# Patient Record
Sex: MALE | Race: WHITE | Employment: OTHER | URBAN - NONMETROPOLITAN AREA
[De-identification: names, ages, dates, MRNs, and addresses within clinical notes are randomized per-mention and may not be internally consistent; named-entity substitution may affect disease eponyms.]

---

## 2017-12-01 RX ORDER — LOSARTAN POTASSIUM 50 MG/1
50 TABLET ORAL DAILY
COMMUNITY

## 2017-12-01 RX ORDER — DULOXETIN HYDROCHLORIDE 60 MG/1
60 CAPSULE, DELAYED RELEASE ORAL DAILY
COMMUNITY

## 2017-12-01 RX ORDER — PANTOPRAZOLE SODIUM 40 MG/1
40 TABLET, DELAYED RELEASE ORAL DAILY
COMMUNITY

## 2017-12-08 ENCOUNTER — HOSPITAL ENCOUNTER (OUTPATIENT)
Age: 82
Setting detail: OUTPATIENT SURGERY
Discharge: HOME | End: 2017-12-08
Attending: SPECIALIST | Admitting: SPECIALIST
Payer: MEDICARE

## 2017-12-08 VITALS
OXYGEN SATURATION: 97 % | WEIGHT: 183.4 LBS | TEMPERATURE: 98.1 F | SYSTOLIC BLOOD PRESSURE: 169 MMHG | HEIGHT: 69 IN | DIASTOLIC BLOOD PRESSURE: 70 MMHG | HEART RATE: 69 BPM | BODY MASS INDEX: 27.16 KG/M2 | RESPIRATION RATE: 18 BRPM

## 2017-12-08 PROCEDURE — 2500000003 HC RX 250 WO HCPCS: Performed by: SPECIALIST

## 2017-12-08 PROCEDURE — A6449 LT COMPRES BAND >=3" <5"/YD: HCPCS | Performed by: SPECIALIST

## 2017-12-08 PROCEDURE — 7100000011 HC PHASE II RECOVERY - ADDTL 15 MIN: Performed by: SPECIALIST

## 2017-12-08 PROCEDURE — 7100000010 HC PHASE II RECOVERY - FIRST 15 MIN: Performed by: SPECIALIST

## 2017-12-08 PROCEDURE — 88331 PATH CONSLTJ SURG 1 BLK 1SPC: CPT

## 2017-12-08 PROCEDURE — 88305 TISSUE EXAM BY PATHOLOGIST: CPT

## 2017-12-08 PROCEDURE — 3600000012 HC SURGERY LEVEL 2 ADDTL 15MIN: Performed by: SPECIALIST

## 2017-12-08 PROCEDURE — 3600000002 HC SURGERY LEVEL 2 BASE: Performed by: SPECIALIST

## 2017-12-08 PROCEDURE — A6223 GAUZE >16<=48 NO W/SAL W/O B: HCPCS | Performed by: SPECIALIST

## 2017-12-08 PROCEDURE — A6446 CONFORM BAND S W>=3" <5"/YD: HCPCS | Performed by: SPECIALIST

## 2017-12-08 RX ORDER — LIDOCAINE HYDROCHLORIDE AND EPINEPHRINE 10; 10 MG/ML; UG/ML
INJECTION, SOLUTION INFILTRATION; PERINEURAL PRN
Status: DISCONTINUED | OUTPATIENT
Start: 2017-12-08 | End: 2017-12-08 | Stop reason: HOSPADM

## 2017-12-08 ASSESSMENT — PAIN SCALES - GENERAL: PAINLEVEL_OUTOF10: 0

## 2017-12-08 ASSESSMENT — PAIN - FUNCTIONAL ASSESSMENT: PAIN_FUNCTIONAL_ASSESSMENT: 0-10

## 2017-12-08 NOTE — PROGRESS NOTES
1226:  Pt & son educated on d/c instructions, both verbalize understanding. Pt dressing for d/c.  /66, improving.    1225:  Pt escorted to passenger side of car, son driving him home. Pt d/c in stable condition.

## 2021-01-05 ENCOUNTER — HOSPITAL ENCOUNTER (EMERGENCY)
Age: 86
Discharge: ANOTHER HOSPITAL | End: 2021-01-05
Attending: EMERGENCY MEDICINE
Payer: MEDICARE

## 2021-01-05 ENCOUNTER — APPOINTMENT (OUTPATIENT)
Dept: GENERAL RADIOLOGY | Age: 86
End: 2021-01-05
Payer: MEDICARE

## 2021-01-05 ENCOUNTER — APPOINTMENT (OUTPATIENT)
Dept: CT IMAGING | Age: 86
End: 2021-01-05
Payer: MEDICARE

## 2021-01-05 VITALS
HEIGHT: 67 IN | OXYGEN SATURATION: 96 % | TEMPERATURE: 97.7 F | SYSTOLIC BLOOD PRESSURE: 177 MMHG | DIASTOLIC BLOOD PRESSURE: 79 MMHG | BODY MASS INDEX: 26.68 KG/M2 | HEART RATE: 74 BPM | WEIGHT: 170 LBS

## 2021-01-05 DIAGNOSIS — E86.0 DEHYDRATION: ICD-10-CM

## 2021-01-05 DIAGNOSIS — G92.8 TOXIC METABOLIC ENCEPHALOPATHY: Primary | ICD-10-CM

## 2021-01-05 LAB
ALBUMIN SERPL-MCNC: 3.4 GM/DL (ref 3.4–5)
ALP BLD-CCNC: 63 U/L (ref 46–116)
ALT SERPL-CCNC: 17 U/L (ref 14–63)
AMORPHOUS: ABNORMAL
ANION GAP: 13 MEQ/L (ref 8–16)
AST SERPL-CCNC: 22 U/L (ref 15–37)
BACTERIA: ABNORMAL
BASOPHILS # BLD: 0.3 % (ref 0–3)
BILIRUB SERPL-MCNC: 0.6 MG/DL (ref 0.2–1)
BILIRUBIN URINE: ABNORMAL
BLOOD, URINE: NEGATIVE
BUN BLDV-MCNC: 31 MG/DL (ref 7–18)
CASTS UA: ABNORMAL /LPF
CHARACTER, URINE: CLEAR
CHLORIDE BLD-SCNC: 103 MEQ/L (ref 98–107)
CO2: 25 MEQ/L (ref 21–32)
COLOR: YELLOW
CREAT SERPL-MCNC: 1.8 MG/DL (ref 0.6–1.3)
CRYSTALS, UA: ABNORMAL
EKG ATRIAL RATE: 97 BPM
EKG P AXIS: 86 DEGREES
EKG P-R INTERVAL: 138 MS
EKG Q-T INTERVAL: 362 MS
EKG QRS DURATION: 92 MS
EKG QTC CALCULATION (BAZETT): 459 MS
EKG R AXIS: 64 DEGREES
EKG T AXIS: 38 DEGREES
EKG VENTRICULAR RATE: 97 BPM
EOSINOPHILS RELATIVE PERCENT: 1.1 % (ref 0–4)
EPITHELIAL CELLS, UA: ABNORMAL /HPF
GFR, ESTIMATED: 38 ML/MIN/1.73M2
GLUCOSE BLD-MCNC: 135 MG/DL (ref 74–106)
GLUCOSE BLD-MCNC: 139 MG/DL (ref 70–108)
GLUCOSE, URINE: NEGATIVE MG/DL
HCT VFR BLD CALC: 43 % (ref 42–52)
HEMOGLOBIN: 14.1 GM/DL (ref 14–18)
ICTOTEST: NEGATIVE
KETONES, URINE: NEGATIVE
LACTATE: 2.1 MMOL/L (ref 0.9–1.7)
LEUKOCYTE ESTERASE, URINE: NEGATIVE
LYMPHOCYTES # BLD: 17.6 % (ref 15–47)
MAGNESIUM: 1.7 MG/DL (ref 1.8–2.4)
MCH RBC QN AUTO: 28.9 PG (ref 27–31)
MCHC RBC AUTO-ENTMCNC: 32.9 GM/DL (ref 33–37)
MCV RBC AUTO: 88 FL (ref 80–94)
MONOCYTES: 17.3 % (ref 0–12)
MUCUS: ABNORMAL
NITRITE, URINE: NEGATIVE
PDW BLD-RTO: 13.7 % (ref 11.5–14.5)
PH UA: 6 (ref 5–9)
PLATELET # BLD: 224 THOU/MM3 (ref 130–400)
PMV BLD AUTO: 7.7 FL (ref 7.4–10.4)
POC CALCIUM: 9 MG/DL (ref 8.5–10.1)
POTASSIUM SERPL-SCNC: 4 MEQ/L (ref 3.5–5.1)
PROTEIN UA: 30 MG/DL
RBC # BLD: 4.89 MILL/MM3 (ref 4.7–6.1)
RBC UA: ABNORMAL /HPF
REFLEX TO URINE C & S: ABNORMAL
SARS-COV-2, NAAT: NOT DETECTED
SEGS: 63.7 % (ref 43–75)
SODIUM BLD-SCNC: 141 MEQ/L (ref 136–145)
SPECIFIC GRAVITY UA: 1.02 (ref 1–1.03)
TOTAL PROTEIN: 7.5 GM/DL (ref 6.4–8.2)
TROPONIN I: < 0.017 NG/ML (ref 0.02–0.06)
UROBILINOGEN, URINE: 0.2 EU/DL (ref 0–1)
WBC # BLD: 5.8 THOU/MM3 (ref 4.8–10.8)
WBC UA: ABNORMAL /HPF

## 2021-01-05 PROCEDURE — 80053 COMPREHEN METABOLIC PANEL: CPT

## 2021-01-05 PROCEDURE — U0002 COVID-19 LAB TEST NON-CDC: HCPCS

## 2021-01-05 PROCEDURE — 99285 EMERGENCY DEPT VISIT HI MDM: CPT

## 2021-01-05 PROCEDURE — 93005 ELECTROCARDIOGRAM TRACING: CPT | Performed by: EMERGENCY MEDICINE

## 2021-01-05 PROCEDURE — 2580000003 HC RX 258: Performed by: EMERGENCY MEDICINE

## 2021-01-05 PROCEDURE — 85025 COMPLETE CBC W/AUTO DIFF WBC: CPT

## 2021-01-05 PROCEDURE — 81001 URINALYSIS AUTO W/SCOPE: CPT

## 2021-01-05 PROCEDURE — 71046 X-RAY EXAM CHEST 2 VIEWS: CPT

## 2021-01-05 PROCEDURE — 82948 REAGENT STRIP/BLOOD GLUCOSE: CPT

## 2021-01-05 PROCEDURE — 83735 ASSAY OF MAGNESIUM: CPT

## 2021-01-05 PROCEDURE — 84484 ASSAY OF TROPONIN QUANT: CPT

## 2021-01-05 PROCEDURE — 83605 ASSAY OF LACTIC ACID: CPT

## 2021-01-05 PROCEDURE — 96361 HYDRATE IV INFUSION ADD-ON: CPT

## 2021-01-05 PROCEDURE — 96360 HYDRATION IV INFUSION INIT: CPT

## 2021-01-05 PROCEDURE — 70450 CT HEAD/BRAIN W/O DYE: CPT

## 2021-01-05 PROCEDURE — 36415 COLL VENOUS BLD VENIPUNCTURE: CPT

## 2021-01-05 PROCEDURE — 2709999900 HC NON-CHARGEABLE SUPPLY

## 2021-01-05 RX ORDER — 0.9 % SODIUM CHLORIDE 0.9 %
1000 INTRAVENOUS SOLUTION INTRAVENOUS ONCE
Status: COMPLETED | OUTPATIENT
Start: 2021-01-05 | End: 2021-01-05

## 2021-01-05 RX ORDER — SODIUM CHLORIDE 9 MG/ML
INJECTION, SOLUTION INTRAVENOUS CONTINUOUS
Status: DISCONTINUED | OUTPATIENT
Start: 2021-01-05 | End: 2021-01-05 | Stop reason: HOSPADM

## 2021-01-05 RX ADMIN — SODIUM CHLORIDE 1000 ML: 9 INJECTION, SOLUTION INTRAVENOUS at 08:26

## 2021-01-05 RX ADMIN — SODIUM CHLORIDE 100 ML/HR: 9 INJECTION, SOLUTION INTRAVENOUS at 15:37

## 2021-01-05 RX ADMIN — SODIUM CHLORIDE 200 ML/HR: 9 INJECTION, SOLUTION INTRAVENOUS at 08:09

## 2021-01-05 ASSESSMENT — ENCOUNTER SYMPTOMS
SORE THROAT: 1
DIARRHEA: 1
SHORTNESS OF BREATH: 0
WHEEZING: 0
VOMITING: 0
ABDOMINAL PAIN: 0
COUGH: 1

## 2021-01-05 NOTE — ED NOTES
Pt dozing. Arouses easily to name. Son at bedside. Denies any needs.        Johan Ghosh, CANDIDO  01/05/21 8115

## 2021-01-05 NOTE — ED NOTES
Up to BR. Soft stool. Nitish well. No sob. Pt states just tired. [facility] supervisor returned call and pt has a bed, bed is still occupied. Pt and family made aware.        Nancy Garcia RN  01/05/21 4698

## 2021-01-05 NOTE — ED NOTES
Son assisted pt to stand at bedside to obtain urine specimen. Steady, but lethargic afterwards.      Marin Boxer, RN  01/05/21 5588

## 2021-01-05 NOTE — ED NOTES
Pt to br per w/c. Nitish well. IV site without redness or edema. Pt pink, warm, dry. Resp easy. Monitor showing nsr. Pt transfer to [facility] per ems in sat condition.        Marin Boxer, RN  01/05/21 5765

## 2021-01-05 NOTE — ED PROVIDER NOTES
[facility]  eMERGENCY dEPARTMENT eNCOUnter             [address]    CHIEF COMPLAINT    Chief Complaint   Patient presents with    Altered Mental Status       Nurses Notes reviewed and I agree except as noted in the HPI. HPI    Kenna Hein is a 80 y.o. male who presents with his son, who states that since 1/2/21 the patient has been confused, apparently hallucinating and thinks that the things that he is imagining or real.  He is not eating or drinking well. He has had diarrhea. The patient states that he feels tired. The son says that he slept all day yesterday. The patient denies any pain or discomfort. He is telling vivid stories of things that he thinks happened in the last few days that did not happen. His wife is in a nursing home, and he is living alone with close support of family members, who have noticed that change in his mentation for several days. He has had no falls. He has no lateralizing weakness. He is shaky. He says \"my muscles are weak \". REVIEW OF SYSTEMS      Review of Systems   Constitutional: Positive for malaise/fatigue. Negative for fever. HENT: Positive for sore throat (\"It feels dry \"). Negative for congestion. Respiratory: Positive for cough. Negative for shortness of breath and wheezing. Cardiovascular: Negative for chest pain, palpitations and leg swelling. Gastrointestinal: Positive for diarrhea. Negative for abdominal pain and vomiting. Genitourinary: Negative for dysuria. Musculoskeletal: Negative for falls, joint pain and myalgias. Neurological: Positive for tremors and weakness. Negative for dizziness, speech change, focal weakness, loss of consciousness and headaches. Psychiatric/Behavioral: Positive for memory loss. All other systems reviewed and are negative. PAST MEDICAL HISTORY     has a past medical history of Acid reflux and Hypertension.     SURGICAL HISTORY     has a past surgical history that includes Prostate surgery; eye surgery; Skin cancer excision (Left, 2017); and EXCISION LESION HAND / FINGER (Left, 2017). CURRENT MEDICATIONS    Previous Medications    ASPIRIN 81 MG TABLET    Take 81 mg by mouth daily    DULOXETINE (CYMBALTA) 60 MG EXTENDED RELEASE CAPSULE    Take 60 mg by mouth daily    LOSARTAN (COZAAR) 50 MG TABLET    Take 50 mg by mouth daily    PANTOPRAZOLE (PROTONIX) 40 MG TABLET    Take 40 mg by mouth daily       ALLERGIES    is allergic to skelaxin [metaxalone]; azactam in iso-osmotic dextrose [aztreonam]; cefuroxime; ciprofloxacin; citalopram; dexamethasone; and shellfish-derived products. FAMILY HISTORY    He indicated that his mother is . He indicated that his father is . family history is not on file. SOCIAL HISTORY     reports that he has never smoked. He has never used smokeless tobacco. He reports that he does not drink alcohol or use drugs. PHYSICAL EXAM       INITIAL VITALS: BP (!) 171/86   Pulse 97   Temp 97.7 °F (36.5 °C)   Resp 30   Ht 5' 7\" (1.702 m)   Wt 170 lb (77.1 kg)   SpO2 95%   BMI 26.63 kg/m²      Physical Exam  Vitals signs and nursing note reviewed. Constitutional:       General: He is not in acute distress. Appearance: He is not toxic-appearing. Comments: Excitedly telling the story of a gathering that he thinks he was he was attending the did not happen, according to his son. HENT:      Head: Atraumatic. Mouth/Throat:      Comments: Tongue, lips are dry. Eyes:      Extraocular Movements: Extraocular movements intact. Pupils: Pupils are equal, round, and reactive to light. Neck:      Musculoskeletal: Neck supple. Cardiovascular:      Rate and Rhythm: Normal rate and regular rhythm. Heart sounds: No murmur. Pulmonary:      Comments: Decreased breath sounds, clear, no respiratory distress. Abdominal:      General: Bowel sounds are normal.      Palpations: Abdomen is soft. There is no mass. Tenderness: There is no abdominal tenderness. Musculoskeletal:         General: No swelling or tenderness. Comments: Significant arthritis changes   Lymphadenopathy:      Cervical: No cervical adenopathy. Skin:     General: Skin is warm and dry. Findings: No rash. Neurological:      Mental Status: He is alert. He is confused. GCS: GCS eye subscore is 4. GCS verbal subscore is 5. GCS motor subscore is 6. Motor: No weakness. Coordination: Coordination normal.      Comments: Tremulous   Psychiatric:         Speech: Speech normal.         DIAGNOSTIC RESULTS    EKG     Normal sinus rhythm, low voltage in the limb leads, nonspecific ST-T wave changes with ST depressions diffusely    RADIOLOGY:    CT HEAD WO CONTRAST   Final Result   No acute intracranial pathology. Sphenoid sinus disease. **This report has been created using voice recognition software. It may contain minor errors which are inherent in voice recognition technology. **      Final report electronically signed by Dr. Rubio Li on 1/5/2021 8:11 AM      XR CHEST (2 VW)   Final Result   Stable radiographic appearance of the chest. No evidence of an acute process. **This report has been created using voice recognition software. It may contain minor errors which are inherent in voice recognition technology. **      Final report electronically signed by Dr. April Singer on 1/5/2021 8:09 AM             LABS:     Labs Reviewed   CBC WITH AUTO DIFFERENTIAL - Abnormal; Notable for the following components:       Result Value    MCHC 32.9 (*)     Monocytes 17.3 (*)     All other components within normal limits   COMPREHENSIVE METABOLIC PANEL - Abnormal; Notable for the following components:    Glucose 135 (*)     CREATININE 1.8 (*)     BUN 31 (*)     All other components within normal limits   MAGNESIUM - Abnormal; Notable for the following components:    Magnesium 1.7 (*)     All other components within normal limits   URINE RT REFLEX TO CULTURE - Abnormal; Notable for the following components:    Bilirubin Urine SMALL (*)     Protein, UA 30 (*)     All other components within normal limits   LACTIC ACID - Abnormal; Notable for the following components:    Lactate 2.10 (*)     All other components within normal limits   GLOMERULAR FILTRATION RATE, ESTIMATED - Abnormal; Notable for the following components:    GFR, Estimated 38 (*)     All other components within normal limits   POCT GLUCOSE - Abnormal; Notable for the following components:    POC Glucose 139 (*)     All other components within normal limits   TROPONIN   COVID-19   ANION GAP   ICTOTEST, URINE       Vitals:    Vitals:    01/05/21 0705 01/05/21 0736 01/05/21 0838   BP: (!) 171/86 (!) 153/98 (!) 179/64   Pulse: 97 84 73   Resp: 30 15 19   Temp: 97.7 °F (36.5 °C)     SpO2: 95% 91% 95%   Weight: 170 lb (77.1 kg)     Height: 5' 7\" (1.702 m)         EMERGENCY DEPARTMENT COURSE:    Monitored, remained in normal sinus rhythm. IV fluids 200 mL per hour, increased to 999 ml/hr after Lactate level. He is feeling some better. He needs admission for acute confusion consistent with metabolic encephalopathy and dehydration. He prefers [facility]. I spoke with the NP for Dr. Tessa Harley, and the patient has been accepted there for transfer. FINAL IMPRESSION      1. Toxic metabolic encephalopathy    2. Dehydration        DISPOSITION/PLAN    DISPOSITION Decision To Transfer 01/05/2021 08:38:30 AM    To [facility], Dr. Tessa Harley accepting.       (Please note that portions of this note were completed with a voice recognition program.  Efforts were made to edit the dictations but occasionally words are mis-transcribed.)      Laxmi Leos MD  01/05/21 3619

## 2021-01-05 NOTE — ED NOTES
Attempted to call admitting nurse at [facility]. Message left on answering machine.        Jorge Morelos RN  01/05/21 1546

## 2022-09-01 ENCOUNTER — HOSPITAL ENCOUNTER (EMERGENCY)
Age: 87
Discharge: HOME | End: 2022-09-01
Attending: EMERGENCY MEDICINE
Payer: MEDICARE

## 2022-09-01 VITALS
BODY MASS INDEX: 25.18 KG/M2 | OXYGEN SATURATION: 97 % | DIASTOLIC BLOOD PRESSURE: 66 MMHG | RESPIRATION RATE: 16 BRPM | TEMPERATURE: 98.4 F | HEIGHT: 69 IN | WEIGHT: 170 LBS | HEART RATE: 59 BPM | SYSTOLIC BLOOD PRESSURE: 155 MMHG

## 2022-09-01 DIAGNOSIS — N30.00 ACUTE CYSTITIS WITHOUT HEMATURIA: Primary | ICD-10-CM

## 2022-09-01 LAB
ALBUMIN SERPL-MCNC: 3.4 GM/DL (ref 3.4–5)
ALP BLD-CCNC: 60 U/L (ref 46–116)
ALT SERPL-CCNC: 15 U/L (ref 14–63)
ANION GAP: 9 MEQ/L (ref 8–16)
AST SERPL-CCNC: 14 U/L (ref 15–37)
BILIRUB SERPL-MCNC: 0.4 MG/DL (ref 0.2–1)
BUN BLDV-MCNC: 34 MG/DL (ref 7–18)
CHLORIDE BLD-SCNC: 104 MEQ/L (ref 98–107)
CO2: 26 MEQ/L (ref 21–32)
CREAT SERPL-MCNC: 2 MG/DL (ref 0.6–1.3)
GFR, ESTIMATED: 34 ML/MIN/1.73M2
GLUCOSE BLD-MCNC: 119 MG/DL (ref 74–106)
HCT VFR BLD CALC: 39.6 % (ref 42–52)
HEMOGLOBIN: 13.4 GM/DL (ref 14–18)
ICTOTEST: NEGATIVE
MCH RBC QN AUTO: 29.1 PG (ref 27–31)
MCHC RBC AUTO-ENTMCNC: 33.9 GM/DL (ref 31–35)
MCV RBC AUTO: 85.6 FL (ref 80–94)
PDW BLD-RTO: 13.2 % (ref 11.5–14.5)
PLATELET # BLD: 206 THOU/MM3 (ref 130–400)
PMV BLD AUTO: 7.5 FL (ref 7.4–10.4)
POC CALCIUM: 8.7 MG/DL (ref 8.5–10.1)
POTASSIUM SERPL-SCNC: 4 MEQ/L (ref 3.5–5.1)
RBC # BLD: 4.62 MILL/MM3 (ref 4.5–6.1)
SODIUM BLD-SCNC: 139 MEQ/L (ref 136–145)
TOTAL PROTEIN: 7.3 GM/DL (ref 6.4–8.2)
WBC # BLD: 5.2 THOU/MM3 (ref 4.8–10.8)

## 2022-09-01 PROCEDURE — 80053 COMPREHEN METABOLIC PANEL: CPT

## 2022-09-01 PROCEDURE — 85025 COMPLETE CBC W/AUTO DIFF WBC: CPT

## 2022-09-01 PROCEDURE — 2580000003 HC RX 258: Performed by: EMERGENCY MEDICINE

## 2022-09-01 PROCEDURE — 6370000000 HC RX 637 (ALT 250 FOR IP): Performed by: EMERGENCY MEDICINE

## 2022-09-01 PROCEDURE — 87086 URINE CULTURE/COLONY COUNT: CPT

## 2022-09-01 PROCEDURE — 81001 URINALYSIS AUTO W/SCOPE: CPT

## 2022-09-01 PROCEDURE — 99284 EMERGENCY DEPT VISIT MOD MDM: CPT

## 2022-09-01 RX ORDER — NITROFURANTOIN 25; 75 MG/1; MG/1
100 CAPSULE ORAL ONCE
Status: COMPLETED | OUTPATIENT
Start: 2022-09-01 | End: 2022-09-01

## 2022-09-01 RX ORDER — 0.9 % SODIUM CHLORIDE 0.9 %
1000 INTRAVENOUS SOLUTION INTRAVENOUS ONCE
Status: COMPLETED | OUTPATIENT
Start: 2022-09-01 | End: 2022-09-01

## 2022-09-01 RX ORDER — NITROFURANTOIN 25; 75 MG/1; MG/1
100 CAPSULE ORAL 2 TIMES DAILY
Qty: 14 CAPSULE | Refills: 0 | Status: SHIPPED | OUTPATIENT
Start: 2022-09-01 | End: 2022-09-08

## 2022-09-01 RX ADMIN — NITROFURANTOIN MONOHYDRATE/MACROCRYSTALLINE 100 MG: 25; 75 CAPSULE ORAL at 23:25

## 2022-09-01 RX ADMIN — SODIUM CHLORIDE 1000 ML: 9 INJECTION, SOLUTION INTRAVENOUS at 21:38

## 2022-09-02 LAB
AMORPHOUS: ABNORMAL
BACTERIA: ABNORMAL
BASOPHILS # BLD: 0.4 %
BASOPHILS ABSOLUTE: 0 THOU/MM3 (ref 0–0.1)
BILIRUBIN URINE: ABNORMAL
BLOOD, URINE: NEGATIVE
CASTS UA: ABNORMAL /LPF
CHARACTER, URINE: ABNORMAL
COLOR: YELLOW
CRYSTALS, UA: ABNORMAL
EOSINOPHIL # BLD: 1.6 %
EOSINOPHILS ABSOLUTE: 0.1 THOU/MM3 (ref 0–0.4)
EPITHELIAL CELLS, UA: ABNORMAL /HPF
GLUCOSE, URINE: NEGATIVE MG/DL
IMMATURE GRANS (ABS): 0.01 THOU/MM3 (ref 0–0.07)
IMMATURE GRANULOCYTES: 0.2 %
KETONES, URINE: ABNORMAL
LEUKOCYTE ESTERASE, URINE: ABNORMAL
LYMPHOCYTES # BLD: 33.1 %
LYMPHOCYTES ABSOLUTE: 1.7 THOU/MM3 (ref 1–4.8)
MONOCYTES # BLD: 20.2 %
MONOCYTES ABSOLUTE: 1 THOU/MM3 (ref 0.4–1.3)
MUCUS: ABNORMAL
NITRITE, URINE: NEGATIVE
NUCLEATED RED BLOOD CELLS: 0 /100 WBC
PH UA: 5.5 (ref 5–9)
PROTEIN UA: 30 MG/DL
RBC UA: ABNORMAL /HPF
REFLEX TO URINE C & S: ABNORMAL
SEG NEUTROPHILS: 44.5 %
SEGMENTED NEUTROPHILS ABSOLUTE COUNT: 2.2 THOU/MM3 (ref 1.8–7.7)
SPECIFIC GRAVITY UA: 1.02 (ref 1–1.03)
UROBILINOGEN, URINE: 0.2 EU/DL (ref 0–1)
WBC UA: ABNORMAL /HPF

## 2022-09-02 NOTE — DISCHARGE INSTRUCTIONS
Increase fluids at home. Take Macrobid twice a day. Next dose tomorrow morning. Call your primary care doctor for close follow-up.

## 2022-09-02 NOTE — ED PROVIDER NOTES
[address] [facility].  Phone: [address]    Chief Complaint   Patient presents with    Other     Pt arrives with son c/o insomnia for the last couple of nights, denies pain, history of dehydration in past son states this is how is started       HPI    Mariah Boston is a 80 y.o. male who presents above-noted complaint. Patient presents with some generalized weakness. Family was concerned he was dehydrated. Has been having vivid dreams and not sleeping sound like he is also had chronic recurrent hallucinations in the past.  When this happens in the past he was dehydrated. Family wanted to stay ahead of that. Patient self is really without much complaint such as headache neck pain chest pain abdominal pain or other issues.     PAST MEDICAL HISTORY    Past Medical History:   Diagnosis Date    Acid reflux     Hypertension        SURGICAL HISTORY    Past Surgical History:   Procedure Laterality Date    EXCISION LESION HAND / FINGER Left 12/8/2017    EXCISION BCC LEFT DORSAL HAND performed by Valentino Nanas, MD at [address] [place] [address]      non-cancerous    SKIN CANCER EXCISION Left 12/08/2017    [address] [facility] DORSAL HAND       CURRENT MEDICATIONS    Current Outpatient Rx   Medication Sig Dispense Refill    nitrofurantoin, macrocrystal-monohydrate, (MACROBID) 100 MG capsule Take 1 capsule by mouth 2 times daily for 7 days 14 capsule 0    losartan (COZAAR) 50 MG tablet Take 50 mg by mouth daily      aspirin 81 MG tablet Take 81 mg by mouth daily      DULoxetine (CYMBALTA) 60 MG extended release capsule Take 60 mg by mouth daily      pantoprazole (PROTONIX) 40 MG tablet Take 40 mg by mouth daily         ALLERGIES    Allergies   Allergen Reactions    Skelaxin [Metaxalone] Anaphylaxis    Azactam In Iso-Osmotic Dextrose [Aztreonam]     Cefuroxime     Ciprofloxacin Citalopram     Dexamethasone     Shellfish-Derived Products        FAMILY HISTORY    No family history on file. SOCIAL HISTORY    Social History     Socioeconomic History    Marital status:    Tobacco Use    Smoking status: Never    Smokeless tobacco: Never   Substance and Sexual Activity    Alcohol use: No    Drug use: No       REVIEW OF SYSTEMS    Positive for insomnia. Negative for headache neck pain chest pain or shortness of breath. All systems negative except as marked. PHYSICAL EXAM    VITAL SIGNS: /67   Pulse 64   Temp 98.4 °F (36.9 °C) (Temporal)   Resp 16   Ht 5' 9\" (1.753 m)   Wt 170 lb (77.1 kg)   SpO2 99%   BMI 25.10 kg/m²    Constitutional:  Alert not toxic or ill,   HENT:  normocephalic, Atraumatic,  Bilateral external ears normal, Oropharynx moist, No oral exudates, Nose normal.  Cervical Spine: Normal range of motion,  No stridor. No tenderness, Supple,  Eyes:  No discharge or  Swelling,Conjunctiva normal, PERRL, EOMI,  Respiratory: No respiratory distress, Normal breath sounds,  No wheezing, No chest tenderness. Cardiovascular:  Normal heart rate, Normal rhythm, No murmurs, No rubs, No gallops. GI:  No reproducible pain, Bowel sounds normal, Soft, No masses, No pulsatile masses. No tenderness  Musculoskeletal:  Intact distal pulses, No edema, No tenderness, No cyanosis, No clubbing. Good range of motion in all major joints. No tenderness to palpation or major deformities noted. Back:No tenderness. Integument:  Warm, Dry, No erythema, No rash (on exposed areas)   Lymphatic:  No lymphadenopathy noted. Neurologic:  Alert & oriented x 3, Normal motor function, Normal sensory function, No focal deficits noted.    Psychiatric:  Affect normal, Judgment normal, Mood normal.     EKG                           RADIOLOGY    No orders to display       PROCEDURES    none  =    CONSULTS:  None      CRITICAL CARE:  None    SCREENINGS:  /67   Pulse 64   Temp 98.4 °F (36.9 °C) (Temporal)   Resp 16   Ht 5' 9\" (1.753 m)   Wt 170 lb (77.1 kg)   SpO2 99%   BMI 25.10 kg/m²     Screening For Hypertension and Follow-up (#317)   previously diagnosed with hypertension and not applicable for screen      Screening For Tobacco Use and Cessation Intervention (#226):   reports that he has never smoked. He has never used smokeless tobacco.  Non-smoker not applicable for screen    ED COURSE & MEDICAL DECISION MAKING    Pertinent Labs & Imaging studies reviewed. (See chart for details)  Presents with possible confusion although here his neurological exam is otherwise normal.  No other channing hallucinations or other findings. He has done that in the past.  Family was concerned for some dehydration. Vital signs are otherwise stable. He has no other focal neurological deficits at this time. Not suspicious for stroke mass or bleeding. Check an urinalysis and giving some IV fluids. He does have decreased appetite not eating or drinking much. He historically has not ate or drank enough. REASSESSMENT  10:18 PM  Patient rechecked and updated on lab/xray status, progress and results. Patient was reassessed and condition was unchanged after treatment. .. Needs nothing else at this time. Lab tests are unremarkable. BUN and creatinine are somewhat near baseline. Give a liter of fluids. Checking urinalysis to rule out infection. Discussed labs and findings with patient and family. Will send home with otherwise stable    11:18 PM EDT  Urinalysis is positive for large leukocytes. There are some ketones. He received some IV fluids. Given cover with Macrobid. Follow-up with outpatient      FINAL IMPRESSION    1.  Acute cystitis without hematuria         PATIENT REFERRED TO:  Shukri Quiros MD  [address]  [phone]    Call in 1 day      DISCHARGE MEDICATIONS:  New Prescriptions    NITROFURANTOIN, MACROCRYSTAL-MONOHYDRATE, (MACROBID) 100 MG CAPSULE Take 1 capsule by mouth 2 times daily for 7 days           Arnoldo Montague MD  09/01/22 5263

## 2022-09-04 LAB — URINE CULTURE, ROUTINE: NORMAL

## 2022-09-13 ASSESSMENT — ENCOUNTER SYMPTOMS
COUGH: 0
EYE REDNESS: 0
VOMITING: 0
SHORTNESS OF BREATH: 0
NAUSEA: 0
COLOR CHANGE: 0

## 2022-09-15 ENCOUNTER — OFFICE VISIT (OUTPATIENT)
Dept: FAMILY MEDICINE CLINIC | Age: 87
End: 2022-09-15
Payer: MEDICARE

## 2022-09-15 VITALS
SYSTOLIC BLOOD PRESSURE: 122 MMHG | DIASTOLIC BLOOD PRESSURE: 70 MMHG | HEART RATE: 66 BPM | TEMPERATURE: 97.6 F | WEIGHT: 178 LBS | BODY MASS INDEX: 26.29 KG/M2

## 2022-09-15 DIAGNOSIS — N30.01 ACUTE CYSTITIS WITH HEMATURIA: Primary | ICD-10-CM

## 2022-09-15 LAB
BILIRUBIN, POC: NEGATIVE
BLOOD URINE, POC: ABNORMAL
CLARITY, POC: CLEAR
COLOR, POC: YELLOW
GLUCOSE URINE, POC: NEGATIVE
KETONES, POC: ABNORMAL
LEUKOCYTE EST, POC: NEGATIVE
NITRITE, POC: ABNORMAL
PH, POC: 6
PROTEIN, POC: NEGATIVE
SPECIFIC GRAVITY, POC: 1.02
UROBILINOGEN, POC: 0.2

## 2022-09-15 PROCEDURE — 1123F ACP DISCUSS/DSCN MKR DOCD: CPT | Performed by: FAMILY MEDICINE

## 2022-09-15 PROCEDURE — 81003 URINALYSIS AUTO W/O SCOPE: CPT | Performed by: FAMILY MEDICINE

## 2022-09-15 PROCEDURE — G8417 CALC BMI ABV UP PARAM F/U: HCPCS | Performed by: FAMILY MEDICINE

## 2022-09-15 PROCEDURE — 1036F TOBACCO NON-USER: CPT | Performed by: FAMILY MEDICINE

## 2022-09-15 PROCEDURE — 99203 OFFICE O/P NEW LOW 30 MIN: CPT | Performed by: FAMILY MEDICINE

## 2022-09-15 PROCEDURE — G8427 DOCREV CUR MEDS BY ELIG CLIN: HCPCS | Performed by: FAMILY MEDICINE

## 2022-09-15 RX ORDER — SULFAMETHOXAZOLE AND TRIMETHOPRIM 800; 160 MG/1; MG/1
1 TABLET ORAL 2 TIMES DAILY
Qty: 20 TABLET | Refills: 0 | Status: SHIPPED | OUTPATIENT
Start: 2022-09-15 | End: 2022-09-25

## 2022-09-15 SDOH — ECONOMIC STABILITY: FOOD INSECURITY: WITHIN THE PAST 12 MONTHS, YOU WORRIED THAT YOUR FOOD WOULD RUN OUT BEFORE YOU GOT MONEY TO BUY MORE.: NEVER TRUE

## 2022-09-15 SDOH — ECONOMIC STABILITY: FOOD INSECURITY: WITHIN THE PAST 12 MONTHS, THE FOOD YOU BOUGHT JUST DIDN'T LAST AND YOU DIDN'T HAVE MONEY TO GET MORE.: NEVER TRUE

## 2022-09-15 ASSESSMENT — PATIENT HEALTH QUESTIONNAIRE - PHQ9
SUM OF ALL RESPONSES TO PHQ QUESTIONS 1-9: 1
2. FEELING DOWN, DEPRESSED OR HOPELESS: 0
SUM OF ALL RESPONSES TO PHQ QUESTIONS 1-9: 1
1. LITTLE INTEREST OR PLEASURE IN DOING THINGS: 1
SUM OF ALL RESPONSES TO PHQ9 QUESTIONS 1 & 2: 1

## 2022-09-15 ASSESSMENT — SOCIAL DETERMINANTS OF HEALTH (SDOH): HOW HARD IS IT FOR YOU TO PAY FOR THE VERY BASICS LIKE FOOD, HOUSING, MEDICAL CARE, AND HEATING?: NOT HARD AT ALL

## 2022-09-25 ENCOUNTER — HOSPITAL ENCOUNTER (EMERGENCY)
Age: 87
Discharge: HOME | End: 2022-09-25
Attending: FAMILY MEDICINE
Payer: MEDICARE

## 2022-09-25 ENCOUNTER — APPOINTMENT (OUTPATIENT)
Dept: GENERAL RADIOLOGY | Age: 87
End: 2022-09-25
Payer: MEDICARE

## 2022-09-25 VITALS
WEIGHT: 175 LBS | HEART RATE: 107 BPM | DIASTOLIC BLOOD PRESSURE: 65 MMHG | SYSTOLIC BLOOD PRESSURE: 151 MMHG | TEMPERATURE: 98.2 F | BODY MASS INDEX: 25.92 KG/M2 | HEIGHT: 69 IN | RESPIRATION RATE: 18 BRPM | OXYGEN SATURATION: 97 %

## 2022-09-25 DIAGNOSIS — R53.1 GENERAL WEAKNESS: Primary | ICD-10-CM
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
## 2022-09-25 LAB
ALBUMIN SERPL-MCNC: 3.5 GM/DL (ref 3.4–5)
ALP BLD-CCNC: 63 U/L (ref 46–116)
ALT SERPL-CCNC: 17 U/L (ref 14–63)
AMORPHOUS: ABNORMAL
ANION GAP: 11 MEQ/L (ref 8–16)
AST SERPL-CCNC: 17 U/L (ref 15–37)
BACTERIA: ABNORMAL
BASOPHILS # BLD: 0.8 % (ref 0–3)
BASOPHILS ABSOLUTE: 0 THOU/MM3 (ref 0–0.1)
BILIRUB SERPL-MCNC: 0.3 MG/DL (ref 0.2–1)
BILIRUBIN URINE: NEGATIVE
BLOOD, URINE: NEGATIVE
BUN BLDV-MCNC: 34 MG/DL (ref 7–18)
CASTS UA: ABNORMAL /LPF
CHARACTER, URINE: CLEAR
CHLORIDE BLD-SCNC: 103 MEQ/L (ref 98–107)
CO2: 23 MEQ/L (ref 21–32)
COLOR: YELLOW
CREAT SERPL-MCNC: 2.3 MG/DL (ref 0.6–1.3)
CRYSTALS, UA: ABNORMAL
EKG ATRIAL RATE: 69 BPM
EKG P AXIS: 58 DEGREES
EKG P-R INTERVAL: 146 MS
EKG Q-T INTERVAL: 404 MS
EKG QRS DURATION: 90 MS
EKG QTC CALCULATION (BAZETT): 432 MS
EKG R AXIS: 20 DEGREES
EKG T AXIS: 18 DEGREES
EKG VENTRICULAR RATE: 69 BPM
EOSINOPHILS ABSOLUTE: 0.1 THOU/MM3 (ref 0–0.5)
EOSINOPHILS RELATIVE PERCENT: 1.5 % (ref 0–4)
EPITHELIAL CELLS, UA: ABNORMAL /HPF
GFR, ESTIMATED: 29 ML/MIN/1.73M2
GLUCOSE BLD-MCNC: 101 MG/DL (ref 74–106)
GLUCOSE, URINE: NEGATIVE MG/DL
HCT VFR BLD CALC: 37.9 % (ref 42–52)
HEMOGLOBIN: 12.4 GM/DL (ref 14–18)
IMMATURE GRANS (ABS): 0.01 THOU/MM3 (ref 0–0.07)
IMMATURE GRANULOCYTES: 0 %
KETONES, URINE: NEGATIVE
LACTATE: 1.7 MMOL/L (ref 0.9–1.7)
LEUKOCYTE ESTERASE, URINE: ABNORMAL
LYMPHOCYTES # BLD: 24.1 % (ref 15–47)
LYMPHOCYTES ABSOLUTE: 1.3 THOU/MM3 (ref 1–4.8)
MCH RBC QN AUTO: 28.8 PG (ref 26–32)
MCHC RBC AUTO-ENTMCNC: 32.7 GM/DL (ref 31–35)
MCV RBC AUTO: 88.1 FL (ref 80–94)
MONOCYTES: 1 THOU/MM3 (ref 0.3–1.3)
MONOCYTES: 18.4 % (ref 0–12)
MUCUS: ABNORMAL
NITRITE, URINE: NEGATIVE
PDW BLD-RTO: 13.7 % (ref 11.5–14.9)
PH UA: 6 (ref 5–9)
PLATELET # BLD: 204 THOU/MM3 (ref 130–400)
PMV BLD AUTO: 10 FL (ref 9.4–12.4)
POC CALCIUM: 8.4 MG/DL (ref 8.5–10.1)
POTASSIUM SERPL-SCNC: 5 MEQ/L (ref 3.5–5.1)
PROTEIN UA: NEGATIVE MG/DL
RBC # BLD: 4.3 MILL/MM3 (ref 4.5–6.1)
RBC UA: ABNORMAL /HPF
REFLEX TO URINE C & S: ABNORMAL
SEG NEUTROPHILS: 55 % (ref 43–75)
SEGMENTED NEUTROPHILS ABSOLUTE COUNT: 2.9 THOU/MM3 (ref 1.8–7.7)
SODIUM BLD-SCNC: 137 MEQ/L (ref 136–145)
SPECIFIC GRAVITY UA: 1.02 (ref 1–1.03)
TOTAL PROTEIN: 7.3 GM/DL (ref 6.4–8.2)
TROPONIN, HIGH SENSITIVITY: 11.3 PG/ML (ref 0–76.1)
UROBILINOGEN, URINE: 0.2 EU/DL (ref 0–1)
WBC # BLD: 5.3 THOU/MM3 (ref 4.8–10.8)
WBC UA: ABNORMAL /HPF

## 2022-09-25 PROCEDURE — 84484 ASSAY OF TROPONIN QUANT: CPT

## 2022-09-25 PROCEDURE — 71046 X-RAY EXAM CHEST 2 VIEWS: CPT

## 2022-09-25 PROCEDURE — 99285 EMERGENCY DEPT VISIT HI MDM: CPT

## 2022-09-25 PROCEDURE — 83605 ASSAY OF LACTIC ACID: CPT

## 2022-09-25 PROCEDURE — 81001 URINALYSIS AUTO W/SCOPE: CPT

## 2022-09-25 PROCEDURE — 2580000003 HC RX 258: Performed by: FAMILY MEDICINE

## 2022-09-25 PROCEDURE — 93005 ELECTROCARDIOGRAM TRACING: CPT | Performed by: FAMILY MEDICINE

## 2022-09-25 PROCEDURE — 96361 HYDRATE IV INFUSION ADD-ON: CPT

## 2022-09-25 PROCEDURE — 93010 ELECTROCARDIOGRAM REPORT: CPT | Performed by: INTERNAL MEDICINE

## 2022-09-25 PROCEDURE — 85025 COMPLETE CBC W/AUTO DIFF WBC: CPT

## 2022-09-25 PROCEDURE — 80053 COMPREHEN METABOLIC PANEL: CPT

## 2022-09-25 PROCEDURE — 96360 HYDRATION IV INFUSION INIT: CPT

## 2022-09-25 RX ORDER — SODIUM CHLORIDE 9 MG/ML
INJECTION, SOLUTION INTRAVENOUS CONTINUOUS
Status: DISCONTINUED | OUTPATIENT
Start: 2022-09-25 | End: 2022-09-26 | Stop reason: HOSPADM

## 2022-09-25 RX ADMIN — SODIUM CHLORIDE: 9 INJECTION, SOLUTION INTRAVENOUS at 20:39

## 2022-09-25 ASSESSMENT — ENCOUNTER SYMPTOMS
NAUSEA: 0
VOMITING: 0
SORE THROAT: 0
SHORTNESS OF BREATH: 0
COUGH: 0

## 2022-09-25 ASSESSMENT — PAIN - FUNCTIONAL ASSESSMENT
PAIN_FUNCTIONAL_ASSESSMENT: NONE - DENIES PAIN
PAIN_FUNCTIONAL_ASSESSMENT: NONE - DENIES PAIN

## 2022-09-26 NOTE — ED PROVIDER NOTES
[facility]  eMERGENCY dEPARTMENT eNCOUnter          CHIEF COMPLAINT       Chief Complaint   Patient presents with    Fall       Nurses Notes reviewed and I agree except as noted in the HPI. HISTORY OF PRESENT ILLNESS    Pura Stafford is a 80 y.o. male who presents with some hallucinations, alterations in his normal mental status according to his son. Symptom duration of about 3 to 4 days. Patient is currently being treated for recurrent UTI. No fever. Did experience a fall a few days back striking his left arm and left lateral chest area. REVIEW OF SYSTEMS     Review of Systems   Constitutional:  Positive for activity change. Negative for chills and fever. HENT:  Negative for congestion and sore throat. Respiratory:  Negative for cough and shortness of breath. Cardiovascular:  Negative for chest pain and palpitations. Gastrointestinal:  Negative for nausea and vomiting. Genitourinary:  Negative for difficulty urinating, dysuria and frequency. Musculoskeletal:  Negative for arthralgias, joint swelling, neck pain and neck stiffness. Skin:  Negative for rash and wound. Neurological:  Negative for dizziness and light-headedness. Psychiatric/Behavioral:  Positive for behavioral problems and hallucinations. All other systems reviewed and are negative. PAST MEDICAL HISTORY    has a past medical history of Acid reflux and Hypertension. SURGICAL HISTORY      has a past surgical history that includes Prostate surgery; eye surgery; Skin cancer excision (Left, 12/08/2017); and EXCISION LESION HAND / FINGER (Left, 12/8/2017).     CURRENT MEDICATIONS       Discharge Medication List as of 9/25/2022 10:46 PM        CONTINUE these medications which have NOT CHANGED    Details   sulfamethoxazole-trimethoprim (BACTRIM DS) 800-160 MG per tablet Take 1 tablet by mouth 2 times daily for 10 days, Disp-20 tablet, R-0Normal      losartan (COZAAR) 50 MG tablet Take 50 mg by mouth dailyHistorical Med      aspirin 81 MG tablet Take 81 mg by mouth dailyHistorical Med      DULoxetine (CYMBALTA) 60 MG extended release capsule Take 60 mg by mouth dailyHistorical Med      pantoprazole (PROTONIX) 40 MG tablet Take 40 mg by mouth dailyHistorical Med             ALLERGIES     is allergic to skelaxin [metaxalone], azactam in iso-osmotic dextrose [aztreonam], cefuroxime, ciprofloxacin, citalopram, dexamethasone, and shellfish-derived products. FAMILY HISTORY     He indicated that his mother is . He indicated that his father is . He indicated that the status of his brother is unknown.   family history includes Hypertension in his mother; Parkinson's Disease in his brother; Stroke in his father. SOCIAL HISTORY      reports that he has never smoked. He has never used smokeless tobacco. He reports that he does not drink alcohol and does not use drugs. PHYSICAL EXAM     INITIAL VITALS:  height is 5' 9\" (1.753 m) and weight is 175 lb (79.4 kg). His temperature is 98.2 °F (36.8 °C). His blood pressure is 151/65 (abnormal) and his pulse is 107 (abnormal). His respiration is 18 and oxygen saturation is 97%. Physical Exam  Vitals and nursing note reviewed. Constitutional:       General: He is not in acute distress. Appearance: He is not ill-appearing. HENT:      Head: Normocephalic and atraumatic. Nose: Nose normal.   Eyes:      General: No scleral icterus. Conjunctiva/sclera: Conjunctivae normal.      Pupils: Pupils are equal, round, and reactive to light. Cardiovascular:      Rate and Rhythm: Regular rhythm. Tachycardia present. Pulses: Normal pulses. Heart sounds: Normal heart sounds. Pulmonary:      Effort: Pulmonary effort is normal.      Breath sounds: Normal breath sounds. Abdominal:      General: Abdomen is flat. Tenderness: There is no abdominal tenderness. There is no guarding or rebound.    Musculoskeletal:      Cervical back: Lungs clear. No abdominal pain. Labs obtained. EKG Shows NSR with PAC. CBC Hgb and HCT stable from prior exams. CMP creatinine 2.3. 9/1/22 creatinine 2.0. NS bolus provided. Lactic 1.70 normal range. Troponin in normal range. UA showed trace leukocytes. Will await results of urine culture to determine if new antibiotic is appropriate. Chest x ray did not show any infiltrates. At this time will recommend follow up in next couple days with PCP. CRITICAL CARE:       CONSULTS:      PROCEDURES:  None    FINAL IMPRESSION      1. General weakness    2. Fall, initial encounter          DISPOSITION/PLAN   Home. Care instructions provided. Follow up with PCP in next couple of days.      PATIENT REFERRED TO:  Sabine Philip MD  [facility] [address]  [address] [place] [address]  [phone]    Schedule an appointment as soon as possible for a visit in 2 days      DISCHARGE MEDICATIONS:  Discharge Medication List as of 9/25/2022 10:46 PM          (Please note that portions of this note were completed with a voice recognition program.  Efforts were made to edit the dictations but occasionally words are mis-transcribed.)    MD Patricia Schaeffer MD  09/26/22 7074

## 2022-09-26 NOTE — PROGRESS NOTES
[address] [place] [address]  Phone:  [phone]          Name: Bree Vitale  : 1932    Chief Complaint   Patient presents with    Follow-up       HPI:     Bree Vitale is a 80 y.o. male who presents today with his son for ER follow up of AMS and a fall. He was seen in the ER on 22 after falling at home. His son reports that he'd been having some hallucinations and changes in mental status; earlier in the month he was being treated for a UTI. A few days ago he fell at home and struck his left arm and lateral chest.  His labs were stable overall; urine culture is pending. The past 2 nights his son stayed at his house and he noticed that his dad will talk in his sleep all night long like he's having conversations that can last 20 seconds. Last night it was singing in his sleep. This morning his son went to wake him up and he was very tired and didn't want to get out of bed which is not like him. He's unsteady on his feet and his son is worried about him falling. He thinks he would benefit from rehab. Current Outpatient Medications:     losartan (COZAAR) 50 MG tablet, Take 50 mg by mouth daily, Disp: , Rfl:     aspirin 81 MG tablet, Take 81 mg by mouth daily, Disp: , Rfl:     DULoxetine (CYMBALTA) 60 MG extended release capsule, Take 60 mg by mouth daily, Disp: , Rfl:     pantoprazole (PROTONIX) 40 MG tablet, Take 40 mg by mouth daily, Disp: , Rfl:     Allergies   Allergen Reactions    Skelaxin [Metaxalone] Anaphylaxis    Azactam In Iso-Osmotic Dextrose [Aztreonam]     Cefuroxime     Ciprofloxacin     Citalopram     Dexamethasone     Shellfish-Derived Products        Subjective:      Review of Systems   Constitutional:  Negative for fever. HENT:  Negative for congestion and rhinorrhea. Eyes:  Negative for discharge and redness. Respiratory:  Negative for cough and shortness of breath.     Cardiovascular:  Negative for chest pain and palpitations. Gastrointestinal:  Negative for blood in stool, constipation, diarrhea, nausea and vomiting. Genitourinary:  Negative for difficulty urinating, dysuria and frequency. Musculoskeletal:  Positive for arthralgias. Psychiatric/Behavioral:  Positive for confusion. Negative for hallucinations. The patient is not nervous/anxious. Objective:     BP (!) 102/50 (Site: Left Upper Arm, Position: Sitting, Cuff Size: Medium Adult)   Pulse 70   Temp (!) 96.4 °F (35.8 °C) (Temporal)   Resp 20   Ht 5' 9.02\" (1.753 m)   Wt 175 lb (79.4 kg)   SpO2 97%   BMI 25.83 kg/m²     Physical Exam  Vitals and nursing note reviewed. Constitutional:       Appearance: He is well-developed. He is ill-appearing. HENT:      Head: Normocephalic and atraumatic. Right Ear: Tympanic membrane, ear canal and external ear normal.      Left Ear: Tympanic membrane, ear canal and external ear normal.   Eyes:      Conjunctiva/sclera: Conjunctivae normal.   Cardiovascular:      Rate and Rhythm: Normal rate and regular rhythm. Heart sounds: Normal heart sounds. Pulmonary:      Effort: Pulmonary effort is normal. No respiratory distress. Breath sounds: Normal breath sounds. Abdominal:      General: Bowel sounds are normal.      Palpations: Abdomen is soft. Tenderness: There is no abdominal tenderness. Musculoskeletal:      Cervical back: Normal range of motion and neck supple. Skin:     General: Skin is warm and dry. Findings: Bruising (left elbow) present. Neurological:      Mental Status: He is alert and oriented to person, place, and time. Motor: No abnormal muscle tone. Psychiatric:         Behavior: Behavior is slowed. Assessment/Plan:     Radha Mcwilliams was seen today for follow-up. Diagnoses and all orders for this visit:    Acute cystitis with hematuria        -     He was treated for a UTI on 9/1 then again on 9/15.   The UA showed large bacteria but the culture showed no growth. He had a repeat UA on 9/25 that showed small bacteria; will see if this can be cultured. CKD (chronic kidney disease) stage 4, GFR 15-29 ml/min ([facility])         -     His recent infections and antibiotic use are likely contributing to his worsening renal function. Gentle hydration encouraged. Frequent falls  Physical deconditioning         -     With his recent infections he has been more weak and has fallen. He would benefit from PT and OT. He would also benefit from Tylenol PRN pain from his falls. Return if symptoms worsen or fail to improve.     Electronically signed by Tricia Andrade MD on 9/27/2022 at 12:28 PM

## 2022-09-26 NOTE — ED NOTES
Discharge teaching and instructions for condition explained to patient. AVS reviewed. Patient voiced understanding regarding follow up appointments and care of self at home. Pt discharged to home in stable condition per family's care.      Omkar Roberson RN  09/25/22 [id number]

## 2022-09-27 ENCOUNTER — OFFICE VISIT (OUTPATIENT)
Dept: FAMILY MEDICINE CLINIC | Age: 87
End: 2022-09-27
Payer: MEDICARE

## 2022-09-27 ENCOUNTER — TELEPHONE (OUTPATIENT)
Dept: FAMILY MEDICINE CLINIC | Age: 87
End: 2022-09-27

## 2022-09-27 VITALS
TEMPERATURE: 96.4 F | WEIGHT: 175 LBS | HEIGHT: 69 IN | OXYGEN SATURATION: 97 % | RESPIRATION RATE: 20 BRPM | DIASTOLIC BLOOD PRESSURE: 50 MMHG | HEART RATE: 70 BPM | SYSTOLIC BLOOD PRESSURE: 102 MMHG | BODY MASS INDEX: 25.92 KG/M2

## 2022-09-27 DIAGNOSIS — R53.81 PHYSICAL DECONDITIONING: ICD-10-CM

## 2022-09-27 DIAGNOSIS — N30.01 ACUTE CYSTITIS WITH HEMATURIA: Primary | ICD-10-CM

## 2022-09-27 DIAGNOSIS — N18.4 CKD (CHRONIC KIDNEY DISEASE) STAGE 4, GFR 15-29 ML/MIN (HCC): ICD-10-CM

## 2022-09-27 DIAGNOSIS — R29.6 FREQUENT FALLS: ICD-10-CM

## 2022-09-27 PROCEDURE — 1123F ACP DISCUSS/DSCN MKR DOCD: CPT | Performed by: FAMILY MEDICINE

## 2022-09-27 PROCEDURE — 99214 OFFICE O/P EST MOD 30 MIN: CPT | Performed by: FAMILY MEDICINE

## 2022-09-27 PROCEDURE — 1036F TOBACCO NON-USER: CPT | Performed by: FAMILY MEDICINE

## 2022-09-27 PROCEDURE — G8417 CALC BMI ABV UP PARAM F/U: HCPCS | Performed by: FAMILY MEDICINE

## 2022-09-27 PROCEDURE — G8427 DOCREV CUR MEDS BY ELIG CLIN: HCPCS | Performed by: FAMILY MEDICINE

## 2022-09-27 ASSESSMENT — ENCOUNTER SYMPTOMS
BLOOD IN STOOL: 0
SHORTNESS OF BREATH: 0
EYE DISCHARGE: 0
DIARRHEA: 0
COUGH: 0
VOMITING: 0
NAUSEA: 0
EYE REDNESS: 0
CONSTIPATION: 0
RHINORRHEA: 0

## 2022-09-27 NOTE — TELEPHONE ENCOUNTER
Per [facility] at [facility] lab they no longer have the urine sample

## 2022-09-27 NOTE — TELEPHONE ENCOUNTER
Spoke with son Radha Arroyo, states pt is at the [facility] and Dr Crystal Salazar is there today.   He is going to have her order a urine there

## 2022-09-27 NOTE — TELEPHONE ENCOUNTER
----- Message from Yaron Mckinley MD sent at 9/27/2022 12:25 PM EDT -----  Regarding: Urine culture  Please call the lab to see if he'll culture his urine from 9/25.

## 2022-09-28 PROBLEM — F43.21 GRIEF: Status: ACTIVE | Noted: 2022-09-28

## 2022-09-28 PROBLEM — R29.6 RECURRENT FALLS: Status: ACTIVE | Noted: 2022-09-28

## 2022-09-28 PROBLEM — K21.9 GASTROESOPHAGEAL REFLUX DISEASE: Status: ACTIVE | Noted: 2022-09-28

## 2022-09-28 PROBLEM — I10 ESSENTIAL HYPERTENSION: Status: ACTIVE | Noted: 2022-09-28

## 2022-09-28 PROBLEM — R53.81 PHYSICAL DECONDITIONING: Status: ACTIVE | Noted: 2022-09-28

## 2022-09-28 PROBLEM — R44.3 HALLUCINATIONS: Status: ACTIVE | Noted: 2022-09-28

## 2022-10-03 PROBLEM — K59.01 SLOW TRANSIT CONSTIPATION: Status: ACTIVE | Noted: 2022-10-03

## 2023-03-22 RX ORDER — GUAIFENESIN 600 MG/1
1200 TABLET, EXTENDED RELEASE ORAL EVERY 12 HOURS PRN
COMMUNITY

## 2023-03-22 RX ORDER — DOXYCYCLINE HYCLATE 100 MG
100 TABLET ORAL 2 TIMES DAILY
COMMUNITY
Start: 2023-03-28 | End: 2023-04-03

## 2023-03-22 RX ORDER — ACETAMINOPHEN 325 MG/1
TABLET ORAL EVERY 4 HOURS PRN
COMMUNITY

## 2023-03-22 RX ORDER — ACETAMINOPHEN 500 MG
TABLET ORAL 2 TIMES DAILY
COMMUNITY

## 2023-03-22 RX ORDER — DOCUSATE SODIUM 100 MG/1
100 CAPSULE, LIQUID FILLED ORAL DAILY PRN
COMMUNITY

## 2023-03-22 RX ORDER — PANTOPRAZOLE SODIUM 20 MG/1
20 TABLET, DELAYED RELEASE ORAL DAILY
COMMUNITY

## 2023-03-22 RX ORDER — DULOXETIN HYDROCHLORIDE 20 MG/1
CAPSULE, DELAYED RELEASE ORAL DAILY
COMMUNITY

## 2023-03-22 RX ORDER — AMLODIPINE BESYLATE 10 MG/1
10 TABLET ORAL DAILY
COMMUNITY

## 2023-03-22 NOTE — PROGRESS NOTES
Arrival time: 0730  Directions given:  Who will be transporting? family  Who will be coming with patient? family   Need to have someone with patient to sign post-op instruction sheet if MAC or    General anesthesia  NPO: take heart and BP medications am of surgery with small sip of water  Is patient oriented? yes  Does patient have POA?  no   If patient has POA need to bring POA papers  Is patient ambulatory?    yes   Does patient use assistive devices? walker  Is patient non-weight bearing?  no   How many people does it take to move patient?  ______Covid vaccine  ______Covid test    Height 5f9i  Weight 188.6lb  Fax: MAR, allergy list, medical/surgical history    General instructions:  NPO after midnight  [employer] and drivers license  Wear comfortable clean clothing  Do not bring jewelry   Shower night before and morning of surgery with a liquid antibacterial soap  Follow all instructions given by your physician   needed at discharge  Send medication list with last dose documented  Name of nurse you spoke with from nursing [place] Micheal Combs

## 2023-03-29 ENCOUNTER — HOSPITAL ENCOUNTER (OUTPATIENT)
Age: 88
Setting detail: OUTPATIENT SURGERY
Discharge: HOME | End: 2023-03-29
Attending: SPECIALIST | Admitting: SPECIALIST
Payer: MEDICARE

## 2023-03-29 ENCOUNTER — ANESTHESIA (OUTPATIENT)
Dept: OPERATING ROOM | Age: 88
End: 2023-03-29
Payer: MEDICARE

## 2023-03-29 ENCOUNTER — ANESTHESIA EVENT (OUTPATIENT)
Dept: OPERATING ROOM | Age: 88
End: 2023-03-29
Payer: MEDICARE

## 2023-03-29 VITALS
RESPIRATION RATE: 16 BRPM | WEIGHT: 192.4 LBS | BODY MASS INDEX: 28.5 KG/M2 | TEMPERATURE: 97.3 F | OXYGEN SATURATION: 97 % | HEIGHT: 69 IN | SYSTOLIC BLOOD PRESSURE: 170 MMHG | DIASTOLIC BLOOD PRESSURE: 71 MMHG | HEART RATE: 71 BPM

## 2023-03-29 DIAGNOSIS — C44.219 BASAL CELL CARCINOMA (BCC) OF LEFT EAR: Primary | ICD-10-CM

## 2023-03-29 PROCEDURE — 2709999900 HC NON-CHARGEABLE SUPPLY: Performed by: SPECIALIST

## 2023-03-29 PROCEDURE — 2500000003 HC RX 250 WO HCPCS: Performed by: SPECIALIST

## 2023-03-29 PROCEDURE — 6360000002 HC RX W HCPCS: Performed by: SPECIALIST

## 2023-03-29 PROCEDURE — 6360000002 HC RX W HCPCS

## 2023-03-29 PROCEDURE — 2580000003 HC RX 258: Performed by: SPECIALIST

## 2023-03-29 PROCEDURE — 3600000012 HC SURGERY LEVEL 2 ADDTL 15MIN: Performed by: SPECIALIST

## 2023-03-29 PROCEDURE — 7100000011 HC PHASE II RECOVERY - ADDTL 15 MIN: Performed by: SPECIALIST

## 2023-03-29 PROCEDURE — 3600000002 HC SURGERY LEVEL 2 BASE: Performed by: SPECIALIST

## 2023-03-29 PROCEDURE — 3700000000 HC ANESTHESIA ATTENDED CARE: Performed by: SPECIALIST

## 2023-03-29 PROCEDURE — 7100000010 HC PHASE II RECOVERY - FIRST 15 MIN: Performed by: SPECIALIST

## 2023-03-29 PROCEDURE — 3700000001 HC ADD 15 MINUTES (ANESTHESIA): Performed by: SPECIALIST

## 2023-03-29 RX ORDER — PYRAZINAMIDE 500 MG/1
1 TABLET ORAL EVERY 6 HOURS PRN
Qty: 12 TABLET | Refills: 0 | Status: SHIPPED | OUTPATIENT
Start: 2023-03-29 | End: 2023-04-01

## 2023-03-29 RX ORDER — CLINDAMYCIN PHOSPHATE 600 MG/50ML
600 INJECTION INTRAVENOUS ONCE
Status: DISCONTINUED | OUTPATIENT
Start: 2023-03-29 | End: 2023-03-29

## 2023-03-29 RX ORDER — LIDOCAINE HYDROCHLORIDE AND EPINEPHRINE BITARTRATE 20; .01 MG/ML; MG/ML
INJECTION, SOLUTION SUBCUTANEOUS PRN
Status: DISCONTINUED | OUTPATIENT
Start: 2023-03-29 | End: 2023-03-29 | Stop reason: ALTCHOICE

## 2023-03-29 RX ORDER — SODIUM CHLORIDE 9 MG/ML
INJECTION, SOLUTION INTRAVENOUS CONTINUOUS
Status: DISCONTINUED | OUTPATIENT
Start: 2023-03-29 | End: 2023-03-29 | Stop reason: HOSPADM

## 2023-03-29 RX ORDER — PROPOFOL 10 MG/ML
INJECTION, EMULSION INTRAVENOUS PRN
Status: DISCONTINUED | OUTPATIENT
Start: 2023-03-29 | End: 2023-03-29 | Stop reason: SDUPTHER

## 2023-03-29 RX ORDER — LIDOCAINE HYDROCHLORIDE AND EPINEPHRINE 10; 10 MG/ML; UG/ML
INJECTION, SOLUTION INFILTRATION; PERINEURAL PRN
Status: DISCONTINUED | OUTPATIENT
Start: 2023-03-29 | End: 2023-03-29 | Stop reason: ALTCHOICE

## 2023-03-29 RX ADMIN — PROPOFOL 20 MG: 10 INJECTION, EMULSION INTRAVENOUS at 08:22

## 2023-03-29 RX ADMIN — PROPOFOL 20 MG: 10 INJECTION, EMULSION INTRAVENOUS at 08:42

## 2023-03-29 RX ADMIN — SODIUM CHLORIDE: 9 INJECTION, SOLUTION INTRAVENOUS at 08:21

## 2023-03-29 RX ADMIN — PROPOFOL 20 MG: 10 INJECTION, EMULSION INTRAVENOUS at 08:21

## 2023-03-29 RX ADMIN — Medication 2000 MG: at 08:21

## 2023-03-29 RX ADMIN — PROPOFOL 10 MG: 10 INJECTION, EMULSION INTRAVENOUS at 08:49

## 2023-03-29 RX ADMIN — PROPOFOL 10 MG: 10 INJECTION, EMULSION INTRAVENOUS at 08:33

## 2023-03-29 RX ADMIN — PROPOFOL 20 MG: 10 INJECTION, EMULSION INTRAVENOUS at 08:29

## 2023-03-29 ASSESSMENT — PAIN SCALES - GENERAL
PAINLEVEL_OUTOF10: 0
PAINLEVEL_OUTOF10: 0

## 2023-03-29 NOTE — DISCHARGE INSTRUCTIONS
how we can work together to prevent Surgical Site Infections. What is a Surgical Site Infection (SSI)? A surgical site infection is an infection that occurs after surgery in the part of the body where the surgery took place. Most patients who have surgery do not develop an infection. However, infections develop in about 1 to 3 out of every 100 patients who have surgery. Some of the common symptoms of a surgical site infection are:  Redness and pain around the area where you had surgery  Drainage of cloudy fluid from your surgical wound  Fever    Can SSIs be treated? Yes. Most surgical site infections can be treated with antibiotics. The antibiotic given to you depends on the bacteria (germs) causing the infection. Sometimes patients with SSIs also need another surgery to treat the infection. What are some of the things that hospitals are doing to prevent SSIs? To prevent SSIs, doctors, nurses, and other healthcare providers: May remove some of your hair immediately before your surgery using electric clippers if the hair is in the same area where the procedure will occur. They should not shave you with a razor. Give you antibiotics before your surgery starts. In most cases, you should get antibiotics within 60 minutes before the surgery starts and the antibiotics should be stopped within 24 hours after surgery. Clean the skin at the site of your surgery with a special soap that kills germs. Clean their hands and arms up to their elbows with an antiseptic agent just before the surgery. Wear special hair covers, masks, gowns, and gloves during surgery to  keep the surgery area clean. Clean their hands with soap and water or an alcohol-based hand rub before and after caring for each patient. If you do not see your providers clean their hands, please     ask  them to do so. What can I do to help prevent SSIs?   Before your surgery:  Tell your doctor about other medical problems you may

## 2023-03-29 NOTE — OP NOTE
left brow and upper eyelid. Therefore a 39cm2 (8cm x 4cm + 5cm2) sum of defect/adjacent tissue transfer laterally based rotation flap was designed, back cut 8cm, elevated 4cm, and inset with 4-0 Monocryl. The Burow's triangles were removed with final closure being 5-0 fast absorbing suture placed in simple running fashion. Bacitracin with bulky sterile dressings were held inplace with ACE wrap. The patient tolerated the procedure well and remained hemodynamically stable throughout the procedure and was quite comfortable throughout the operative course. 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  36cm2 defect of left postauricular area s/p MOHS for basal cell carcinoma       2.  5cm2 defect of left lower forehead s/p MOHS for basal cell carcinoma    Detailed Description of Procedure:    1. Full thickness skin graft (36 cm2) repair of left postauricular area (CPT 84313 & 34390)      2.   Repair of left lower forehead defect with an adjacent tissue transfer (39cm2) (CPT T0095261)      Electronically signed by Linette Prado MD on 3/29/2023 at 9:32 AM

## 2023-03-29 NOTE — ANESTHESIA PRE PROCEDURE
Department of Anesthesiology  Preprocedure Note       Name:  Mirza Medeiros   Age:  80 y.o.  :  1932                                          MRN:  [id number]         Date:  3/29/2023      Surgeon: Sonia Mcgarry):  Anna Vivar MD    Procedure: Procedure(s):  MOHS DEFECT REPAIR BCC LEFT LWOER FOREHEAD AND BCC LEFT POSTAURICULAR    Medications prior to admission:   Prior to Admission medications    Medication Sig Start Date End Date Taking? Authorizing Provider   amLODIPine (NORVASC) 10 MG tablet Take 10 mg by mouth daily   Yes Historical Provider, MD   docusate sodium (COLACE) 100 MG capsule Take 100 mg by mouth daily as needed for Constipation   Yes Historical Provider, MD   DULoxetine (CYMBALTA) 20 MG extended release capsule Take by mouth daily 2 tab   Yes Historical Provider, MD   doxycycline hyclate (VIBRA-TABS) 100 MG tablet Take 100 mg by mouth 2 times daily 3/28/23 4/3/23 Yes Historical Provider, MD   guaiFENesin (MUCINEX) 600 MG extended release tablet Take 1,200 mg by mouth every 12 hours as needed for Congestion   Yes Historical Provider, MD   pantoprazole (PROTONIX) 20 MG tablet Take 20 mg by mouth daily   Yes Historical Provider, MD   acetaminophen (TYLENOL) 325 MG tablet Take by mouth every 4 hours as needed for Pain or Fever 2 tab   Yes Historical Provider, MD   acetaminophen (TYLENOL) 500 MG tablet Take by mouth 2 times daily 2 tab   Yes Historical Provider, MD   losartan (COZAAR) 50 MG tablet Take 50 mg by mouth daily    Historical Provider, MD       Current medications:    Current Facility-Administered Medications   Medication Dose Route Frequency Provider Last Rate Last Admin    0.9 % sodium chloride infusion   IntraVENous Continuous Anna Vivar MD        ceFAZolin (ANCEF) 2000 mg in 0.9% sodium chloride 50 mL IVPB  2,000 mg IntraVENous 30 Min Pre-Op Anna Vivar MD           Allergies:     Allergies   Allergen Reactions    Skelaxin [Metaxalone] Anaphylaxis    Azactam In

## 2023-03-29 NOTE — PROGRESS NOTES
DRESSING=  CHEST-BACITRACIN, XEROFORM, GAUZE, ABD  POSTAURICULAR-BACITRACIN, XEROFORM,GAUZE, KERLIX, ACE  FOREHEAD -BACITRACIN, XEROFORM

## 2023-03-29 NOTE — PROGRESS NOTES
2341 Patient arrived to phase II via chair. Spontaneous respiraitons even and unlabored. Placed on monitor--VSS. Report received from OR RN  0998 Assessment completed. Patient is alert and oriented x4. IV infusing at [facility]-- no complications. Patient denies pain--will monitor. Surgical sites clean and dry. 36 Snack and drink provided. Pt. Denies all other needs. Call light handed to pt. Family at beside. 3365 RN at bedside. Pt. States readiness to be discharged. 1003 1009 Pt. Standing with stand by assist of RN. Pt. Denies weakness or dizziness. 1010 Discharge instructions reviewed with the pt. And his family. All questions addressed. AVS and script handed to family. 1013 INT removed. No complications noted. 1017 Pt. Getting self dressed with the assistance of his family. 1 Family left to get private vehicle. 1028 Pt. Ambulated to private vehicle with his walker. RN at his side.

## 2023-03-29 NOTE — ANESTHESIA POSTPROCEDURE EVALUATION
Department of Anesthesiology  Postprocedure Note    Patient: Norberto Jones  MRN: [id number]  YOB: 1932  Date of evaluation: 3/29/2023      Procedure Summary     Date: 03/29/23 Room / Location: 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 MRN:  [id number]      Last Vitals:  BP (!) 170/71   Pulse 71   Temp 97.3 °F (36.3 °C) (Temporal)   Resp 16   Ht 5' 9\" (1.753 m)   Wt 192 lb 6.4 oz (87.3 kg)   SpO2 97%   BMI 28.41 kg/m²   Patient Vitals in the past 4 hrs:  03/29/23 0931, BP:(!) 170/71, Temp:97.3 °F (36.3 °C), Temp src:Temporal, Pulse:71, Resp:16, SpO2:97 %  03/29/23 0747, BP:(!) 141/66, Temp:97.6 °F (36.4 °C), Temp src:Temporal, Pulse:63, Resp:18, SpO2:96 %, Height:5' 9\" (1.753 m), Weight:192 lb 6.4 oz (87.3 kg)    Level of Consciousness:  Awake    Respiratory:  Stable    Oxygen Saturation:  Stable    Cardiovascular:  Stable    Hydration:  Adequate    PONV:  Stable    Post-op Pain:  Adequate analgesia    Post-op Assessment:  No apparent anesthetic complications    Additional Follow-Up / Treatment / Comment:  None    Neil Perez DO  March 29, 2023   10:17 AM

## 2024-02-16 PROBLEM — Z85.828 HISTORY OF SKIN CANCER: Status: ACTIVE | Noted: 2024-02-16

## 2024-02-16 PROBLEM — K59.01 SLOW TRANSIT CONSTIPATION: Status: RESOLVED | Noted: 2022-10-03 | Resolved: 2024-02-16

## 2024-02-16 PROBLEM — R29.6 RECURRENT FALLS: Status: RESOLVED | Noted: 2022-09-28 | Resolved: 2024-02-16

## 2024-02-16 PROBLEM — D64.9 NORMOCYTIC ANEMIA: Status: ACTIVE | Noted: 2024-02-16

## 2024-02-16 PROBLEM — F43.21 GRIEF: Status: RESOLVED | Noted: 2022-09-28 | Resolved: 2024-02-16

## 2024-02-16 PROBLEM — D63.8 ANEMIA OF CHRONIC DISEASE: Status: ACTIVE | Noted: 2024-02-16

## 2024-02-16 PROBLEM — L57.0 ACTINIC KERATOSIS: Status: ACTIVE | Noted: 2024-02-16

## 2024-02-16 PROBLEM — R44.3 HALLUCINATIONS: Status: RESOLVED | Noted: 2022-09-28 | Resolved: 2024-02-16

## 2024-02-16 PROBLEM — Z85.828 HISTORY OF BASAL CELL CARCINOMA: Status: ACTIVE | Noted: 2024-02-16

## 2024-04-25 PROBLEM — N18.32 CKD STAGE 3B, GFR 30-44 ML/MIN (HCC): Status: ACTIVE | Noted: 2022-09-27

## 2025-04-01 NOTE — H&P
[address] [address]  History and Physical Update    Pt Name: Mirza Medeiros  MRN: [id number]  YOB: 1932  Date of evaluation: 3/29/2023    I have examined the patient and reviewed the H&P/Consult and there are no changes to the patient or plans.       Anna Vivar MD  Electronically signed 3/29/2023 at 8:07 AM Patient was following up on a document that she dropped off on 3/27.    If the document can be ready by 4/4 please give her a call to pick it up.

## (undated) DEVICE — COTTON BALL ST

## (undated) DEVICE — BANDAGE,GAUZE,4.5"X4.1YD,STERILE,LF: Brand: MEDLINE

## (undated) DEVICE — PACK PROCEDURE SURG PLAS SC MIN SRHP LF

## (undated) DEVICE — CHLORAPREP 26ML CLEAR

## (undated) DEVICE — STERILE COTTON BALLS LARGE 5/P: Brand: MEDLINE

## (undated) DEVICE — SPONGE GZ W4XL4IN COT 12 PLY TYP VII WVN C FLD DSGN

## (undated) DEVICE — GLOVE SURG SZ 8 L11.77IN FNGR THK9.8MIL STRW LTX POLYMER

## (undated) DEVICE — GOWN,SIRUS,NON REINFRCD,LARGE,SET IN SL: Brand: MEDLINE

## (undated) DEVICE — GLOVE SURG SZ 65 THK91MIL LTX FREE SYN POLYISOPRENE

## (undated) DEVICE — GLOVE ORANGE PI 7   MSG9070

## (undated) DEVICE — SUTURE MCRYL SZ 4-0 L18IN ABSRB UD P-3 L13MM 3/8 CIR PRIM Y494G

## (undated) DEVICE — DRESSING,GAUZE,XEROFORM,CURAD,5"X9",ST: Brand: CURAD

## (undated) DEVICE — BANDAGE COMPR E SGL LAYERED CLSR BGE W/ CLP W4INXL15FT